# Patient Record
Sex: FEMALE | Race: BLACK OR AFRICAN AMERICAN | NOT HISPANIC OR LATINO | Employment: FULL TIME | URBAN - METROPOLITAN AREA
[De-identification: names, ages, dates, MRNs, and addresses within clinical notes are randomized per-mention and may not be internally consistent; named-entity substitution may affect disease eponyms.]

---

## 2018-04-03 ENCOUNTER — OFFICE VISIT (OUTPATIENT)
Dept: INTERNAL MEDICINE | Facility: CLINIC | Age: 53
End: 2018-04-03
Payer: COMMERCIAL

## 2018-04-03 ENCOUNTER — HOSPITAL ENCOUNTER (OUTPATIENT)
Dept: RADIOLOGY | Facility: HOSPITAL | Age: 53
Discharge: HOME | End: 2018-04-03
Attending: PHYSICIAN ASSISTANT
Payer: COMMERCIAL

## 2018-04-03 VITALS
HEART RATE: 87 BPM | DIASTOLIC BLOOD PRESSURE: 72 MMHG | HEIGHT: 64 IN | RESPIRATION RATE: 16 BRPM | WEIGHT: 198.44 LBS | OXYGEN SATURATION: 98 % | BODY MASS INDEX: 33.88 KG/M2 | SYSTOLIC BLOOD PRESSURE: 104 MMHG | TEMPERATURE: 98 F

## 2018-04-03 DIAGNOSIS — K21.9 GASTROESOPHAGEAL REFLUX DISEASE WITHOUT ESOPHAGITIS: ICD-10-CM

## 2018-04-03 DIAGNOSIS — M25.562 ACUTE PAIN OF LEFT KNEE: ICD-10-CM

## 2018-04-03 DIAGNOSIS — M79.671 FOOT PAIN, RIGHT: ICD-10-CM

## 2018-04-03 DIAGNOSIS — M25.562 ACUTE PAIN OF LEFT KNEE: Primary | ICD-10-CM

## 2018-04-03 DIAGNOSIS — Z00.00 ENCOUNTER FOR ANNUAL PHYSICAL EXAM: ICD-10-CM

## 2018-04-03 PROCEDURE — 73564 X-RAY EXAM KNEE 4 OR MORE: CPT | Mod: 26,LT,, | Performed by: RADIOLOGY

## 2018-04-03 PROCEDURE — 73630 X-RAY EXAM OF FOOT: CPT | Mod: TC,FY,PO,RT

## 2018-04-03 PROCEDURE — 73562 X-RAY EXAM OF KNEE 3: CPT | Mod: 26,59,RT, | Performed by: RADIOLOGY

## 2018-04-03 PROCEDURE — 73562 X-RAY EXAM OF KNEE 3: CPT | Mod: TC,FY,PO,RT

## 2018-04-03 PROCEDURE — 73630 X-RAY EXAM OF FOOT: CPT | Mod: 26,RT,, | Performed by: RADIOLOGY

## 2018-04-03 PROCEDURE — 99214 OFFICE O/P EST MOD 30 MIN: CPT | Mod: S$GLB,,, | Performed by: PHYSICIAN ASSISTANT

## 2018-04-03 PROCEDURE — 99999 PR PBB SHADOW E&M-EST. PATIENT-LVL IV: CPT | Mod: PBBFAC,,, | Performed by: PHYSICIAN ASSISTANT

## 2018-04-03 RX ORDER — PANTOPRAZOLE SODIUM 40 MG/1
40 TABLET, DELAYED RELEASE ORAL DAILY
Qty: 30 TABLET | Refills: 0 | Status: SHIPPED | OUTPATIENT
Start: 2018-04-03 | End: 2018-05-28 | Stop reason: SDUPTHER

## 2018-04-03 NOTE — PROGRESS NOTES
Subjective:       Patient ID: Alma Rosa Carranza is a 53 y.o.B/ female.    Chief Complaint: Knee Pain (L) and Gastroesophageal Reflux    HPI         She comes in by herself today and she is walking assisted by one crutch because of pain in her left knee.  She actually has 4 needs today which will be outlined in a few seconds.  She sprained her knee about 4-7 days ago and does not know how she did it.  It hurts on the medial aspect of her left knee.  She hasn't noticed any swelling.  It also hurts downwards towards the tibial surface and upwards towards the popliteal space.  She has knee pain all the time and she also has foot pain all the time.  She hasn't done anything athletically to aggravate this problem.        She also had podiatric surgery outside the clinic on her right foot at least 2 or 3 times in 2013- 2015 and a plate installed on the top of her right foot.  He broke one time and had to be replaced and now she thinks it has raised off the surface of the bones again.        She did have endoscopy and colonoscopy done 2013.  She was placed on Protonix 40 mg but has run out of it and can't get a refill.  She has instead been taking Zantac 150 mg 2 in the morning and 1 in the evening but it is not controlling her reflux of acid.  She was told in the past that she had a narrowing of her esophagus but they never really came out and said she had a Francisco's esophagus.  She probably needs to go back and see the gastroenterologist for possible endoscopy.  I'll put her back on her Protonix and see if this controls the acid reflux.        She needs to get some yearly blood work done and she has not had a mammogram done in a couple of years.  Looking at her health maintenance needs she also has not had a hepatitis C test.  She has been putting on a lot of weight and she wonders if her thyroid is working properly or not.    Review of Systems    Otherwise negative.  Objective:      Physical Exam   Constitutional:  She is oriented to person, place, and time. She appears well-developed and well-nourished.   She is walking with one crutch under her left arm to help support her painful left knee.  Without the crutch she hobbles quite a bit because of the knee pain.  The right foot does not seem to give her pain or discomfort.   HENT:   Head: Normocephalic and atraumatic.   Right Ear: External ear normal.   Left Ear: External ear normal.   Nose: Nose normal.   Mouth/Throat: Oropharynx is clear and moist. No oropharyngeal exudate.   Eyes: Conjunctivae and EOM are normal. Pupils are equal, round, and reactive to light. No scleral icterus.   Neck: Normal range of motion. Neck supple. No JVD present. No tracheal deviation present. No thyromegaly present.   Cardiovascular: Normal rate, regular rhythm, normal heart sounds and intact distal pulses.  Exam reveals no gallop and no friction rub.    No murmur heard.  Pulmonary/Chest: Effort normal and breath sounds normal. No respiratory distress. She has no wheezes. She has no rales.   Abdominal: Soft. Bowel sounds are normal. She exhibits no distension and no mass. There is no tenderness. There is no rebound and no guarding.   Genitourinary:   Genitourinary Comments: This portion of the examination was not accomplished today.   Musculoskeletal: Normal range of motion. She exhibits no edema or tenderness.   She has slight decreased flexion due to a mild effusion being present.  The knee is not hot or red.  All signs are negative except for pain with test on her medial collateral ligament.  These signs include patella, spring, collateral, Prabhakar's, and drawer signs.   Lymphadenopathy:     She has no cervical adenopathy.   Neurological: She is alert and oriented to person, place, and time. She has normal reflexes. She displays normal reflexes. No cranial nerve deficit. Coordination normal.   Skin: Skin is warm and dry. No rash noted. She is not diaphoretic. No erythema.   Psychiatric: She  has a normal mood and affect. Her behavior is normal. Judgment and thought content normal.   Nursing note and vitals reviewed.        Assessment:       1. Acute pain of left knee    2. Foot pain, right    3. Gastroesophageal reflux disease without esophagitis    4. Encounter for annual physical exam        Plan:     1.  Refilled her Protonix 40 mg.  Stop her Zantac 150 mg.  She also may want to use Gaviscon ES chewable tablets intermittently when she has reflux.  2.  Check CPX labs to include: CBC, Chem-20, FLP's, hepatitis C, TSH.  3.  X-ray right foot.  Refer her to podiatry for evaluation after her x-ray.  4.  X-ray left knee.  Start wearing a neoprene knee brace.  Also take arthritis strength Tylenol for her knee discomfort.  I wouldn't recommend an NSAID right now while she is having reflux problems.  5.  Schedule screening mammogram.  6.  Follow-up after these tests are done.

## 2018-04-06 ENCOUNTER — PATIENT MESSAGE (OUTPATIENT)
Dept: INTERNAL MEDICINE | Facility: CLINIC | Age: 53
End: 2018-04-06

## 2018-04-06 DIAGNOSIS — M79.671 RIGHT FOOT PAIN: ICD-10-CM

## 2018-04-06 DIAGNOSIS — T84.498A OTHER MECHANICAL COMPLICATION OF OTHER INTERNAL ORTHOPEDIC DEVICES, IMPLANTS AND GRAFTS, INITIAL ENCOUNTER: Primary | ICD-10-CM

## 2018-05-03 ENCOUNTER — HOSPITAL ENCOUNTER (OUTPATIENT)
Dept: RADIOLOGY | Facility: HOSPITAL | Age: 53
Discharge: HOME | End: 2018-05-03
Attending: PHYSICIAN ASSISTANT
Payer: COMMERCIAL

## 2018-05-03 VITALS — BODY MASS INDEX: 33.8 KG/M2 | WEIGHT: 198 LBS | HEIGHT: 64 IN

## 2018-05-03 DIAGNOSIS — Z00.00 ENCOUNTER FOR ANNUAL PHYSICAL EXAM: ICD-10-CM

## 2018-05-03 PROCEDURE — 77067 SCR MAMMO BI INCL CAD: CPT | Mod: TC,PO

## 2018-05-03 PROCEDURE — 77063 BREAST TOMOSYNTHESIS BI: CPT | Mod: 26,,, | Performed by: RADIOLOGY

## 2018-05-03 PROCEDURE — 77067 SCR MAMMO BI INCL CAD: CPT | Mod: 26,,, | Performed by: RADIOLOGY

## 2018-05-08 ENCOUNTER — TELEPHONE (OUTPATIENT)
Dept: INTERNAL MEDICINE | Facility: CLINIC | Age: 53
End: 2018-05-08

## 2018-05-08 DIAGNOSIS — R92.1 CALCIFICATION OF RIGHT BREAST ON MAMMOGRAPHY: Primary | ICD-10-CM

## 2018-05-08 NOTE — TELEPHONE ENCOUNTER
Reviewed 05/03/18 mammogram results w/ pt (see result note). Advised pt will call her back once Rt sided mammo scheduled, pt voiced understanding.     Order pended for provider signature.

## 2018-05-09 NOTE — TELEPHONE ENCOUNTER
S/w Patricia in Mammo sched appt for diag mammo on 05/17/18 @ 3:00pm [facility]. Pt informed of appt time and instructions, pt voiced understanding.

## 2018-05-17 ENCOUNTER — HOSPITAL ENCOUNTER (OUTPATIENT)
Dept: RADIOLOGY | Facility: HOSPITAL | Age: 53
Discharge: HOME | End: 2018-05-17
Attending: PHYSICIAN ASSISTANT
Payer: COMMERCIAL

## 2018-05-17 DIAGNOSIS — R92.1 CALCIFICATION OF RIGHT BREAST ON MAMMOGRAPHY: ICD-10-CM

## 2018-05-17 PROCEDURE — 77065 DX MAMMO INCL CAD UNI: CPT | Mod: TC,PO,RT

## 2018-05-17 PROCEDURE — 77065 DX MAMMO INCL CAD UNI: CPT | Mod: 26,RT,, | Performed by: RADIOLOGY

## 2018-05-23 ENCOUNTER — TELEPHONE (OUTPATIENT)
Dept: RADIOLOGY | Facility: HOSPITAL | Age: 53
End: 2018-05-23

## 2018-05-24 ENCOUNTER — TELEPHONE (OUTPATIENT)
Dept: INTERNAL MEDICINE | Facility: CLINIC | Age: 53
End: 2018-05-24

## 2018-05-24 NOTE — TELEPHONE ENCOUNTER
S/w pt confirming she's sched to see breast specialist Dr. Dominguez at [facility], advised pt would send results to this provider, pt voiced understanding.   Pt stated she was told she would have to go to [facility] location for records and radiology films to take to [facility] and there would be a charge for her records, advised pt would send inquiry to that dept and let her know their response, pt voiced understanding.

## 2018-05-24 NOTE — TELEPHONE ENCOUNTER
----- Message from Yusra Ceballos NP sent at 5/23/2018  4:56 PM CDT -----  Contact: PCP  Hi,  I made the pt an appt to see me Yusra, on Monday- 5/28/18 at 8:30 am at the [facility] location. Please tell her to check in on the second floor of that clinic and to be there by 8:15 am.     This is a consultation appointment to coordinate her care and get her scheduled for the biopsy so no procedure will be done that day. An examination, review and explaination of test results and biopsy procedure to be scheduled will be discussed.     Please call pt and inform of this appt.    Thank you!    Yusra  ----- Message -----  From: Susan Germain MA  Sent: 5/23/2018   4:42 PM  To: Yusra Ceballos NP    Per ARIN Carrera, could you please take a look at pt's 05/23/18 mammogram result - does pt need to see you prior to the suggested biopsy, and if so we'll place a referral to get pt scheduled? Or if not how do we go about ordering it?

## 2018-05-24 NOTE — TELEPHONE ENCOUNTER
04/2018 and 05/2018 mammo results faxed to Dr. Tiana Dominguez's office w/ [facility] F#[phone] P#[phone], fax transmission confirmed.

## 2018-05-28 DIAGNOSIS — K21.9 GASTROESOPHAGEAL REFLUX DISEASE WITHOUT ESOPHAGITIS: ICD-10-CM

## 2018-05-28 RX ORDER — PANTOPRAZOLE SODIUM 40 MG/1
TABLET, DELAYED RELEASE ORAL
Qty: 30 TABLET | Refills: 0 | Status: SHIPPED | OUTPATIENT
Start: 2018-05-28 | End: 2018-07-05 | Stop reason: SDUPTHER

## 2018-07-05 DIAGNOSIS — K21.9 GASTROESOPHAGEAL REFLUX DISEASE WITHOUT ESOPHAGITIS: ICD-10-CM

## 2018-07-05 RX ORDER — PANTOPRAZOLE SODIUM 40 MG/1
TABLET, DELAYED RELEASE ORAL
Qty: 30 TABLET | Refills: 0 | Status: SHIPPED | OUTPATIENT
Start: 2018-07-05 | End: 2018-08-04 | Stop reason: SDUPTHER

## 2018-07-12 ENCOUNTER — TELEPHONE (OUTPATIENT)
Dept: INTERNAL MEDICINE | Facility: CLINIC | Age: 53
End: 2018-07-12

## 2018-07-12 NOTE — TELEPHONE ENCOUNTER
----- Message from Ramírez Freeman sent at 7/12/2018  2:24 PM CDT -----  Contact: pt   Pt returning call, pls call back.         ..[phone] (home)

## 2018-07-12 NOTE — TELEPHONE ENCOUNTER
S/w pt RE Pre-op appt/testing needed for breast surgery sched 07/23/18 thru [facility], advised pt would have to see other provider since ARIN Keyred since he's out office until 07/23/18. Pt voiced understanding, stating she will call back to sched appt if unable to sched w/ PCP Dr. Carlos Eduardo Stevens and if 04/2018 lab results need to be sent to her office.

## 2018-07-12 NOTE — TELEPHONE ENCOUNTER
----- Message from Lydia Brower sent at 7/12/2018 10:26 AM CDT -----  Contact: Alma [address] at [phone]    Regarding pre op clearance being sent over

## 2018-07-12 NOTE — TELEPHONE ENCOUNTER
S/w pt and sched for pre-op appt w/ NP Kevyn on Monday 07/16/18 @ 08:40AM, pt confirmed appt date/time and provider. Pt denied given form from surgeons' office, clearance notes need to be send to Dr. Angelica crawford/ [facility] and Dr. Taylor Theunissen, Plastic Surgeon.

## 2018-07-12 NOTE — TELEPHONE ENCOUNTER
S/w Pati, Dr. Dominguez's assistant, she confirmed pt does need the following pre-op labs: CBC, CMP, PT/PTT, CXR, and EKG, no form to be completed, just send results w/ clearanace note to their F#[phone].

## 2018-07-16 ENCOUNTER — CLINICAL SUPPORT (OUTPATIENT)
Dept: CARDIOLOGY | Facility: CLINIC | Age: 53
End: 2018-07-16
Payer: COMMERCIAL

## 2018-07-16 ENCOUNTER — HOSPITAL ENCOUNTER (OUTPATIENT)
Dept: RADIOLOGY | Facility: HOSPITAL | Age: 53
Discharge: HOME | End: 2018-07-16
Attending: NURSE PRACTITIONER
Payer: COMMERCIAL

## 2018-07-16 ENCOUNTER — OFFICE VISIT (OUTPATIENT)
Dept: INTERNAL MEDICINE | Facility: CLINIC | Age: 53
End: 2018-07-16
Payer: COMMERCIAL

## 2018-07-16 VITALS
BODY MASS INDEX: 33.73 KG/M2 | DIASTOLIC BLOOD PRESSURE: 74 MMHG | TEMPERATURE: 98 F | SYSTOLIC BLOOD PRESSURE: 116 MMHG | HEIGHT: 64 IN | OXYGEN SATURATION: 100 % | WEIGHT: 197.56 LBS | HEART RATE: 84 BPM

## 2018-07-16 DIAGNOSIS — Z01.818 PRE-OP EXAM: ICD-10-CM

## 2018-07-16 DIAGNOSIS — Z01.818 PRE-OP EXAM: Primary | ICD-10-CM

## 2018-07-16 DIAGNOSIS — K21.9 GASTROESOPHAGEAL REFLUX DISEASE WITHOUT ESOPHAGITIS: ICD-10-CM

## 2018-07-16 DIAGNOSIS — E66.9 OBESITY, CLASS I, BMI 30-34.9: ICD-10-CM

## 2018-07-16 PROCEDURE — 71046 X-RAY EXAM CHEST 2 VIEWS: CPT | Mod: TC,FY,PO

## 2018-07-16 PROCEDURE — 3008F BODY MASS INDEX DOCD: CPT | Mod: CPTII,S$GLB,, | Performed by: NURSE PRACTITIONER

## 2018-07-16 PROCEDURE — 99999 PR PBB SHADOW E&M-EST. PATIENT-LVL III: CPT | Mod: PBBFAC,,, | Performed by: NURSE PRACTITIONER

## 2018-07-16 PROCEDURE — 93000 ELECTROCARDIOGRAM COMPLETE: CPT | Mod: S$GLB,,, | Performed by: NUCLEAR MEDICINE

## 2018-07-16 PROCEDURE — 99214 OFFICE O/P EST MOD 30 MIN: CPT | Mod: S$GLB,,, | Performed by: NURSE PRACTITIONER

## 2018-07-16 PROCEDURE — 71046 X-RAY EXAM CHEST 2 VIEWS: CPT | Mod: 26,,, | Performed by: RADIOLOGY

## 2018-08-04 DIAGNOSIS — K21.9 GASTROESOPHAGEAL REFLUX DISEASE WITHOUT ESOPHAGITIS: ICD-10-CM

## 2018-08-06 RX ORDER — PANTOPRAZOLE SODIUM 40 MG/1
TABLET, DELAYED RELEASE ORAL
Qty: 30 TABLET | Refills: 0 | Status: SHIPPED | OUTPATIENT
Start: 2018-08-06 | End: 2018-08-30 | Stop reason: SDUPTHER

## 2018-08-30 DIAGNOSIS — K21.9 GASTROESOPHAGEAL REFLUX DISEASE WITHOUT ESOPHAGITIS: ICD-10-CM

## 2018-08-31 DIAGNOSIS — K21.9 GASTROESOPHAGEAL REFLUX DISEASE WITHOUT ESOPHAGITIS: ICD-10-CM

## 2018-08-31 RX ORDER — PANTOPRAZOLE SODIUM 40 MG/1
TABLET, DELAYED RELEASE ORAL
Qty: 30 TABLET | Refills: 0 | OUTPATIENT
Start: 2018-08-31

## 2018-08-31 RX ORDER — PANTOPRAZOLE SODIUM 40 MG/1
TABLET, DELAYED RELEASE ORAL
Qty: 30 TABLET | Refills: 0 | Status: SHIPPED | OUTPATIENT
Start: 2018-08-31 | End: 2018-09-05 | Stop reason: SDUPTHER

## 2018-09-05 ENCOUNTER — TELEPHONE (OUTPATIENT)
Dept: PODIATRY | Facility: CLINIC | Age: 53
End: 2018-09-05

## 2018-09-05 ENCOUNTER — LAB VISIT (OUTPATIENT)
Dept: LAB | Facility: HOSPITAL | Age: 53
End: 2018-09-05
Attending: PHYSICIAN ASSISTANT
Payer: COMMERCIAL

## 2018-09-05 ENCOUNTER — OFFICE VISIT (OUTPATIENT)
Dept: INTERNAL MEDICINE | Facility: CLINIC | Age: 53
End: 2018-09-05
Payer: COMMERCIAL

## 2018-09-05 VITALS
OXYGEN SATURATION: 97 % | RESPIRATION RATE: 16 BRPM | DIASTOLIC BLOOD PRESSURE: 72 MMHG | TEMPERATURE: 98 F | HEART RATE: 88 BPM | BODY MASS INDEX: 33.69 KG/M2 | WEIGHT: 197.31 LBS | HEIGHT: 64 IN | SYSTOLIC BLOOD PRESSURE: 122 MMHG

## 2018-09-05 DIAGNOSIS — Z85.3 HISTORY OF CANCER OF RIGHT BREAST: ICD-10-CM

## 2018-09-05 DIAGNOSIS — K21.9 GASTROESOPHAGEAL REFLUX DISEASE WITHOUT ESOPHAGITIS: ICD-10-CM

## 2018-09-05 DIAGNOSIS — Z01.818 PRE-OPERATIVE CLEARANCE: Primary | ICD-10-CM

## 2018-09-05 DIAGNOSIS — M47.812 DJD (DEGENERATIVE JOINT DISEASE), CERVICAL: Chronic | ICD-10-CM

## 2018-09-05 DIAGNOSIS — Z01.818 PRE-OPERATIVE CLEARANCE: ICD-10-CM

## 2018-09-05 DIAGNOSIS — M50.30 DEGENERATIVE CERVICAL DISC: ICD-10-CM

## 2018-09-05 DIAGNOSIS — E66.9 OBESITY, CLASS I, BMI 30-34.9: ICD-10-CM

## 2018-09-05 LAB
ALBUMIN SERPL BCP-MCNC: 4.3 G/DL
ALP SERPL-CCNC: 91 U/L
ALT SERPL W/O P-5'-P-CCNC: 12 U/L
ANION GAP SERPL CALC-SCNC: 8 MMOL/L
AST SERPL-CCNC: 16 U/L
BASOPHILS # BLD AUTO: 0.01 K/UL
BASOPHILS NFR BLD: 0.2 %
BILIRUB SERPL-MCNC: 0.8 MG/DL
BUN SERPL-MCNC: 8 MG/DL
CALCIUM SERPL-MCNC: 10 MG/DL
CHLORIDE SERPL-SCNC: 107 MMOL/L
CO2 SERPL-SCNC: 27 MMOL/L
CREAT SERPL-MCNC: 1 MG/DL
DIFFERENTIAL METHOD: ABNORMAL
EOSINOPHIL # BLD AUTO: 0.1 K/UL
EOSINOPHIL NFR BLD: 2.6 %
ERYTHROCYTE [DISTWIDTH] IN BLOOD BY AUTOMATED COUNT: 14.6 %
EST. GFR  (AFRICAN AMERICAN): >60 ML/MIN/1.73 M^2
EST. GFR  (NON AFRICAN AMERICAN): >60 ML/MIN/1.73 M^2
GLUCOSE SERPL-MCNC: 103 MG/DL
HCT VFR BLD AUTO: 38 %
HGB BLD-MCNC: 12.4 G/DL
LYMPHOCYTES # BLD AUTO: 1.7 K/UL
LYMPHOCYTES NFR BLD: 33.1 %
MCH RBC QN AUTO: 31.5 PG
MCHC RBC AUTO-ENTMCNC: 32.6 G/DL
MCV RBC AUTO: 96 FL
MONOCYTES # BLD AUTO: 0.6 K/UL
MONOCYTES NFR BLD: 11.1 %
NEUTROPHILS # BLD AUTO: 2.7 K/UL
NEUTROPHILS NFR BLD: 53 %
PLATELET # BLD AUTO: 405 K/UL
PMV BLD AUTO: 9.6 FL
POTASSIUM SERPL-SCNC: 4.4 MMOL/L
PROT SERPL-MCNC: 7.9 G/DL
RBC # BLD AUTO: 3.94 M/UL
SODIUM SERPL-SCNC: 142 MMOL/L
WBC # BLD AUTO: 5.04 K/UL

## 2018-09-05 PROCEDURE — 85025 COMPLETE CBC W/AUTO DIFF WBC: CPT | Mod: PO

## 2018-09-05 PROCEDURE — 36415 COLL VENOUS BLD VENIPUNCTURE: CPT | Mod: PO

## 2018-09-05 PROCEDURE — 99999 PR PBB SHADOW E&M-EST. PATIENT-LVL V: CPT | Mod: PBBFAC,,, | Performed by: PHYSICIAN ASSISTANT

## 2018-09-05 PROCEDURE — 80053 COMPREHEN METABOLIC PANEL: CPT | Mod: PO

## 2018-09-05 PROCEDURE — 3008F BODY MASS INDEX DOCD: CPT | Mod: CPTII,S$GLB,, | Performed by: PHYSICIAN ASSISTANT

## 2018-09-05 PROCEDURE — 99215 OFFICE O/P EST HI 40 MIN: CPT | Mod: S$GLB,,, | Performed by: PHYSICIAN ASSISTANT

## 2018-09-05 RX ORDER — ANASTROZOLE 1 MG/1
1 TABLET ORAL DAILY
COMMUNITY
Start: 2018-08-30 | End: 2019-08-30

## 2018-09-05 RX ORDER — PANTOPRAZOLE SODIUM 40 MG/1
40 TABLET, DELAYED RELEASE ORAL DAILY
COMMUNITY
Start: 2018-08-06 | End: 2018-09-05 | Stop reason: SDUPTHER

## 2018-09-05 RX ORDER — MELATONIN 5 MG
2 CAPSULE ORAL NIGHTLY PRN
COMMUNITY

## 2018-09-05 RX ORDER — ACETAMINOPHEN 500 MG
1000 TABLET ORAL
COMMUNITY

## 2018-09-05 RX ORDER — PANTOPRAZOLE SODIUM 40 MG/1
40 TABLET, DELAYED RELEASE ORAL DAILY
Qty: 30 TABLET | Refills: 4 | Status: SHIPPED | OUTPATIENT
Start: 2018-09-05

## 2018-09-05 NOTE — PROGRESS NOTES
Subjective:       Patient ID: Alma Rosa Carranza is a 53 y.o.B/ female.    Chief Complaint: Pre-op Exam (18, R ankle w/ hardware, Dr. Beaver w/ [facility])    HPI         She comes in by herself today and has the above problem.  She is going to have a 3rd or 4th operation on her right foot by podiatrist Dr. Elizabet Beaver on  and she needs a pre-op clearance.  She says a couple of the screws holding some plates have loosened and are floating in the top of her foot and there has not been adequate calcium buildup to heal the fracture.  This has been going on now for 4 years.  She has no idea how the fracture 1st occurred because she had no trauma or MVAs.  She has had vitamin-D levels that were low but she is not taking vitamin-D at this time.  She also has had a DEXA scan run at [facility] 2 years ago but does not know the results of that test.  She has never had 1 run here.  She would like to get a 2nd opinion from a podiatrist.          She currently does not have any acute illness symptoms including her:  RESPIRATORY, PULMONARY, GI, , GYN, and INTEGUMENT system review.    PAST MEDICAL HISTORY    CHILDHOOD:  Negative.  MEDICAL:  Degenerative joint disease multiple areas. DDD of cervical spine .   Right breast cancer 2018. GERD.  SURGICAL:  Patient is  A/B 0. Left foot surgery . Tubal ligation age 29. Right foot operated on , 2015, 2016.  Right Breast removal 2018. In   FAMILY:  Father  age 72.  Had lung cancer.                 Mother is alive in fair health  Age 76. Has had bladder cancer and followed by breast cancer.                   She had 3 sisters originally.  One sister  age 44 due to breast cancer.  The other 2 sisters are living and well.                 She has 1 brother living and well.                 She has 3 children living and well age 38, 30, 24.                     She has 6 grand children all living and well.                   She has  0 great grandchildren.  SOCIAL HABITS:  She is  times 18 years.  She lives at home with her 24-year-old child.                 She is a paraprofessional in elementary school..                 She never used tobacco products.                 She socially uses ETOH products                   She never uses caffeine products.                 EDUCATION:12 + 2   With an  Associate degree.                 She has had no foreign travel.                        She has never required any tropical diseases.    Review of Systems   Constitutional: Negative.  Negative for appetite change, chills, diaphoresis, fatigue, fever and unexpected weight change.   HENT: Negative.  Negative for congestion, ear pain, hearing loss, mouth sores, postnasal drip, rhinorrhea, sinus pressure, sneezing, sore throat, tinnitus, trouble swallowing and voice change.    Eyes: Negative.  Negative for photophobia, discharge, redness and visual disturbance.   Respiratory: Negative for cough, chest tightness, shortness of breath and wheezing.    Cardiovascular: Negative.  Negative for chest pain, palpitations and leg swelling.   Gastrointestinal: Negative.  Negative for abdominal pain, blood in stool, constipation, diarrhea, nausea and vomiting.   Endocrine: Negative.  Negative for cold intolerance, heat intolerance, polydipsia and polyuria.   Genitourinary: Negative.  Negative for difficulty urinating, dysuria, enuresis, frequency, hematuria, pelvic pain, urgency, vaginal bleeding, vaginal discharge and vaginal pain.   Musculoskeletal: Negative.  Negative for arthralgias, back pain, gait problem, myalgias and neck stiffness.   Skin: Negative for color change and rash.   Allergic/Immunologic: Negative.  Negative for environmental allergies, food allergies and immunocompromised state.   Neurological: Negative.  Negative for dizziness, tremors, syncope, weakness, light-headedness, numbness and headaches.   Hematological: Negative.  Negative for  adenopathy. Does not bruise/bleed easily.   Psychiatric/Behavioral: Negative.  Negative for confusion, dysphoric mood, hallucinations, self-injury and sleep disturbance. The patient is not nervous/anxious.        Otherwise negative.  Objective:      Physical Exam   Constitutional: She is oriented to person, place, and time. She appears well-developed and well-nourished.   She is ambulatory with no noticeable limp.  Her gait is normal.  Her posture is normal.  She is not utilizing any accessory devices such as a cane, crutch, or walker.   HENT:   Head: Normocephalic and atraumatic.   Right Ear: External ear normal.   Left Ear: External ear normal.   Nose: Nose normal.   Mouth/Throat: Oropharynx is clear and moist.   Her hearing is normal and she does not use hearing aids.  Her teeth and gums are in good repair.   Eyes: EOM are normal. Pupils are equal, round, and reactive to light. No scleral icterus.   She is not wearing any glasses or contacts.   Neck: Normal range of motion. Neck supple. No thyromegaly present.   Cardiovascular: Normal rate, regular rhythm, normal heart sounds and intact distal pulses. Exam reveals no gallop.   No murmur heard.  Pulmonary/Chest: Effort normal and breath sounds normal. No respiratory distress. She has no wheezes. She has no rales.   Abdominal: Soft. Bowel sounds are normal. She exhibits no distension and no mass.   Genitourinary:   Genitourinary Comments: This portion of the examination was not accomplished today.   Musculoskeletal: Normal range of motion. She exhibits no edema.   Lymphadenopathy:     She has no cervical adenopathy.   Neurological: She is alert and oriented to person, place, and time. She has normal reflexes. No cranial nerve deficit.   Skin: Skin is warm and dry. No rash noted. No erythema.   Psychiatric: She has a normal mood and affect. Her behavior is normal. Judgment and thought content normal.   Nursing note and vitals reviewed.   Her CBC and Chem 20 were  normal.    Assessment:       1. Pre-operative clearance    2. Degenerative cervical disc    3. DJD (degenerative joint disease), cervical    4. History of cancer of right breast    5. Obesity, Class I, BMI 30-34.9    6. Gastroesophageal reflux disease without esophagitis        Plan:       1. Pre- operative labs have been ordered to include CBC, Chem 20, vitamin-D level, and DEXA scan.  2. She asked for and a 2nd opinion Podiatry referral was done here in the clinic with 1 of our podiatrists.  3. She would be low risk for complication from local or general anesthetic and would be cleared for surgery as long as her lab values come back normal.  4. She was also instructed to start on vitamin D3 31070 units once a week and also 1200 mg calcium tablets b.i.d..  These can be done in the form of Tums antacids.

## 2018-09-05 NOTE — TELEPHONE ENCOUNTER
Ms. Alma Rosa Dixon was given a call and she was informed that her appointment with Dr. Jimenez has been scheduled for tomorrow 9/6/2018 for 9am on the 2nd floor of the [facility]. When asked what foot she was coming in for she stated the right foot to have a 2nd opinion in regards to having surgery since 2014 by a foot and ankle specialist by the name Dr. Elizabet Beaver. She also informed me that she has a history of flat feet. Patient verbalized understanding of her appointment date and time and the call ended well.        FYI: One of the registration desk ladies,came back and stated that Ms. Carranza wanted to be worked in on Dr. Jimenez schedule to have a 2nd opinion before her 9/18/2018 sx. I asked what type of foot sx she was having and what podiatrist was performing the sx, but the registration desk lady did not have an answer for that. She last seen Dr. Nava in 2013 or 2014 and has an upcoming appointment with her, but she stated that she wanted to see Dr. Jimenez. After consulting with Dr. Jimenez I was given the okay to schedule the patient on 9/6/2018 for 9am.

## 2018-09-06 ENCOUNTER — OFFICE VISIT (OUTPATIENT)
Dept: PODIATRY | Facility: CLINIC | Age: 53
End: 2018-09-06
Payer: COMMERCIAL

## 2018-09-06 VITALS
SYSTOLIC BLOOD PRESSURE: 123 MMHG | BODY MASS INDEX: 33.69 KG/M2 | HEART RATE: 106 BPM | HEIGHT: 64 IN | WEIGHT: 197.31 LBS | DIASTOLIC BLOOD PRESSURE: 72 MMHG

## 2018-09-06 DIAGNOSIS — Z96.9 RETAINED ORTHOPEDIC HARDWARE: ICD-10-CM

## 2018-09-06 DIAGNOSIS — M19.079 ARTHRITIS OF MIDFOOT: Primary | ICD-10-CM

## 2018-09-06 DIAGNOSIS — M21.41 ACQUIRED PES PLANOVALGUS OF RIGHT FOOT: ICD-10-CM

## 2018-09-06 DIAGNOSIS — E66.9 OBESITY, CLASS I, BMI 30-34.9: ICD-10-CM

## 2018-09-06 DIAGNOSIS — M62.461 GASTROCNEMIUS EQUINUS OF RIGHT LOWER EXTREMITY: ICD-10-CM

## 2018-09-06 DIAGNOSIS — M62.462 GASTROCNEMIUS EQUINUS OF LEFT LOWER EXTREMITY: ICD-10-CM

## 2018-09-06 PROCEDURE — 99999 PR PBB SHADOW E&M-EST. PATIENT-LVL III: CPT | Mod: PBBFAC,,, | Performed by: PODIATRIST

## 2018-09-06 PROCEDURE — 99243 OFF/OP CNSLTJ NEW/EST LOW 30: CPT | Mod: S$GLB,,, | Performed by: PODIATRIST

## 2018-09-06 NOTE — PATIENT INSTRUCTIONS
Dr. Eduardo Fischer, DPM  [facility]  [address]  [place], [place] [id number] [phone]

## 2018-09-06 NOTE — LETTER
September 9, 2018      Christopher Carrera PA-C  [address] [place] [address]           [facility] - Podiat[facility]  [address] [place] [address]  Phone: [phone]  Fax: [phone]          Patient: Alma Rosa Carranza   MR Number: [id number]   YOB: 1965   Date of Visit: 9/6/2018       Dear Christopher Carrera:    Thank you for referring Alma Rosa Carranza to me for evaluation. Attached you will find relevant portions of my assessment and plan of care.    If you have questions, please do not hesitate to call me. I look forward to following Alma Rosa Carranza along with you.    Sincerely,    Emely Jimenez, QUINTIN    Enclosure  CC:  No Recipients    If you would like to receive this communication electronically, please contact [email or web address] or [phone] to request more information on [employer] access.    For providers and/or their staff who would like to refer a patient to [facility], please contact us through our one-stop-shop provider referral line, [facility] , at [phone].    If you feel you have received this communication in error or would no longer like to receive these types of communications, please e-mail [email or web address]

## 2018-09-06 NOTE — PROGRESS NOTES
[facility] -   PODIATRIC MEDICINE AND SURGERY  PROGRESS NOTE  9/9/2018    PODIATRY NOTE  PCP:  Primary Doctor No    CHIEF COMPLAINT   Chief Complaint   Patient presents with    Foot Problem     Pt states that she had sx 4 years ago on her right foot, possible fusion, and the screws  and plate broke several times, and she was told that it never fused. Dr. Beaver did her sx and nothing was done about it. She states that she is in pain 5/10.       HPI:    Alma Rosa Carranza is a 53 y.o. female who has a past medical history of Arthralgia.   Alma Rosa presents to clinic today complaining of right foot surgery. Pt describes what sounds as a medial column fusion, and soft tissue tendon transfer for flatfoot surgery. States she had surgery in 2014 and then stated her hardware broke and had repeat intervention one year later. Surgeon- Dr. Beaver. She is here to for second opinion; pain present for medial aspect flatfoot .  It she states that she started to have pain along the surgical site and midfoot for the past 4 months.  She did see Dr. renee physician assistant last week recommended repeat surgical intervention and also noted that she had broken hardware.  Patient is concerned as this is the 3rd surgery that she will have to undergo and continues to have surgical complications.  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 She is currently off work as she has been on disability since undergoing mastectomy for breast cancer.  She was referred to my clinic by my colleague Christopher Carrera for consultation and evaluation of her longstanding right foot pain.      Patient denies other pedal complaints at this time.     PMH  Past Medical History:   Diagnosis Date    Arthralgia        PROBLEM LIST  Patient Active Problem List    Diagnosis Date Noted    History of cancer of right breast 09/05/2018    Obesity, Class I, BMI 30-34.9 06/24/2016    Schatzki's ring 05/18/2015    Degenerative  cervical disc 11/25/2014    DJD (degenerative joint disease), cervical 11/25/2014    Plantar fasciitis of right foot 06/06/2014    Pes planus (flat feet) 06/06/2014    Lipoma 06/06/2014    GERD (gastroesophageal reflux disease) 10/02/2013    Constipation 10/02/2013    Arthralgia 10/02/2013    Insomnia 10/02/2013       MEDS  Current Outpatient Medications on File Prior to Visit   Medication Sig Dispense Refill    acetaminophen (TYLENOL) 500 MG tablet Take 1,000 mg by mouth as needed.      anastrozole (ARIMIDEX) 1 mg Tab Take 1 mg by mouth once daily.      diphenhydramine-acetaminophen (TYLENOL PM)  mg Tab Take 1 tablet by mouth nightly as needed.      Lactobacillus acidophilus (PROBIOTIC ACIDOPHILUS ORAL) Take 1 capsule by mouth once daily.      melatonin 5 mg Cap Take 2 capsules by mouth nightly as needed.      pantoprazole (PROTONIX) 40 MG tablet Take 1 tablet (40 mg total) by mouth once daily. 30 tablet 4    pedi multivit no.19-folic acid (CHILDREN'S MULTI-VIT GUMMIES) 200 mcg Chew Take by mouth.      SCAR CARE BASE ENHANCED Gel Apply 0.5 g topically 2 (two) times daily.  3     No current facility-administered medications on file prior to visit.           Medication List           Accurate as of 9/6/18 11:59 PM. If you have any questions, ask your nurse or doctor.               CONTINUE taking these medications    acetaminophen 500 MG tablet  Commonly known as:  TYLENOL     anastrozole 1 mg Tab  Commonly known as:  ARIMIDEX     CHILDREN'S MULTI-VIT GUMMIES 200 mcg Chew  Generic drug:  pedi multivit no.19-folic acid     diphenhydramine-acetaminophen  mg Tab  Commonly known as:  TYLENOL PM     melatonin 5 mg Cap     pantoprazole 40 MG tablet  Commonly known as:  PROTONIX  Take 1 tablet (40 mg total) by mouth once daily.     PROBIOTIC ACIDOPHILUS ORAL     SCAR CARE BASE ENHANCED Gel  Generic drug:  gel base no.184 (bulk)            PS     Past Surgical History:   Procedure Laterality Date     COLONOSCOPY N/A 11/25/2013    [facility]formed [facility]    EGD (ESOPHAGOGASTRODUODENOSCOPY) N/A 11/25/2013    Per[facility] by [facility] at [facility]    FOOT SURGERY Bilateral     with Hardware Placement    MODIFIED RADICAL MASTECTOMY W/ AXILLARY LYMPH NODE DISSECTION Right 07/23/2018    TUBAL LIGATION          ALL  Review of patient's allergies indicates:   Allergen Reactions    Clindamycin Nausea And Vomiting    Hydrocodone Nausea And Vomiting       SOC     Social History     Tobacco Use    Smoking status: Never Smoker    Smokeless tobacco: Never Used   Substance Use Topics    Alcohol use: Yes     Frequency: 2-4 times a month     Comment: Occasionally    Drug use: No         FAMILY HX    Family History   Problem Relation Age of Onset    Cancer Mother         Bladder  cancer    Breast cancer Mother     Hypertension Father     Cancer Sister         Breast with mets    Breast cancer Sister     Heart failure Paternal Grandfather     Cancer Maternal Grandmother         Colon cancer    Colon cancer Paternal Grandmother     Glaucoma Maternal Aunt     Cataracts Maternal Aunt     Macular degeneration Maternal Aunt     Heart disease Neg Hx         CAD    Stroke Neg Hx     Diabetes Neg Hx             REVIEW OF SYSTEMS  General: This patient is well-developed, well-nourished and appears stated age, well-oriented to person, place and time, and cooperative and pleasant on today's visit  Constitutional: Negative for chills and fever.   Respiratory: Negative for shortness of breath.    Cardiovascular: Negative for chest pain, palpitations, orthopnea  Gastrointestinal: Negative for diarrhea, nausea and vomiting.   Musculoskeletal: Positive for above noted in HPI  Skin: Negative for rash, skin, or nail changes  Neurological: Negative for tingling and sensory changes  Peripheral Vascular: no claudication or cyanosis  Psychiatric/Behavioral: Negative for altered mental status     PHYSICAL  "EXAM:      Vitals:    09/06/18 0914   BP: 123/72   Pulse: 106   Weight: 89.5 kg (197 lb 5 oz)   Height: 5' 4" (1.626 m)         LOWER EXTREMITY PHYSICAL EXAM  VASCULAR  Dorsalis pedis and posterior tibial pulses palpable 2/4 bilaterally. Capillary refill time immediate to the toes. Feet are warm to the touch. Skin temperature warm to warm from proximally to distally There are no varicosities, telangiectasias noted to bilateral foot and ankle regions. There are no ecchymoses noted to bilateral foot and ankle regions. There is gross lower extremity edema.    DERMATOLOGIC  Skin moist with healthy texture and turgor.There are no open ulcerations, lacerations, or fissures to bilateral foot and ankle regions. There are no signs of infection as there is no erythema, no proximal-extending lymphangiitis, no fluctuance. There is  crepitus noted on palpation to midfoot RIGHT.  There is no interdigital maceration.   There are no hyperkeratotic lesions noted to feet. Nails are well-trimmed.    NEUROLOGIC  Epicritic sensation is intact as the patient is able to sense light touch to bilateral foot and ankle regions. Achilles and patellar deep tendon reflexes intact. Babinski reflex absent    ORTHOPEDIC/BIOMECHANICAL  There is tenderness with dorsal and medial palpation of right foot along surgical sites.  There is prominent hardware palpated on mid foot right. Muscle strength AT/EHL/EDL/PT: 5/5; Achilles/Gastroc/Soleus: 5/5; PB/PL: 5/5 Muscle tone is normal. Ankle joint ROM non painful with decreased DF noted with knee extended and flexed; STJ ROM  Inv/ev non painful, limited ; MTPJ b/l  DF/PF, non crepitus ; Foot type: maximally pronated.    IMAGING   Reviewed by me and I agree with radiologist findings, 3 views of foot/ankle, reveal:    Results for orders placed during the hospital encounter of 04/03/18   X-Ray Foot Complete Right    Narrative EXAMINATION:  XR FOOT COMPLETE 3 VIEW RIGHT    CLINICAL HISTORY:  pt had surgery with " plate implant 2015 and it has loosened up or broken.;. Pain in right foot    TECHNIQUE:  AP, lateral, and oblique views of the right foot were performed.    COMPARISON:  None    FINDINGS:  Postsurgical changes with plate and screw fixation are noted about the 1st tarsometatarsal joint.  There is evidence of hardware failure with fracture of the three more proximal screws.  (There is a screw fragment seen within the dorsal soft tissues  of the midfoot.  Two additional screws have also fractured migrated dorsally).  There are degenerative changes at the junction of the hindfoot and midfoot as well as within the midfoot with some soft tissue prominence about the forefoot region.  Pes planus deformity is noted.  No acute fracture or dislocation is seen.      Impression As above      Electronically signed by: Edgardo Hemphill MD  Date:    04/03/2018  Time:    10:50           ASSESSMENT     Encounter Diagnoses   Name Primary?    Arthritis of midfoot Yes    Retained orthopedic hardware     Acquired pes planovalgus of right foot     Obesity, Class I, BMI 30-34.9     Gastrocnemius equinus of right lower extremity     Gastrocnemius equinus of left lower extremity          PLAN  Patient was educated about clinical and imaging findings, and verbalizes understanding of above.     Diagnoses and all orders for this visit:  Arthritis of midfoot    Retained orthopedic hardware    Acquired pes planovalgus of right foot    Obesity, Class I, BMI 30-34.9    Gastrocnemius equinus of right lower extremity    Gastrocnemius equinus of left lower extremity      I discussed in depth with patient radiographic findings and clinical findings.  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  I did discuss with patient surgical intervention options and recovery.  She does have follow-up scheduled with Dr. Beaver who I agree  she should see and discuss surgical planning and prognosis.  Patient would also benefit from getting bone density testing and starting vitamin-D supplementation and she has previous history of nonunion.  She will likely require biological substitutes to ensure adequate healing as this is her 3rd surgical intervention.  All questions were answered to patient's satisfaction.  Patient was informed to report to my clinic if there are any further problems questions or assistance needed.    Patient has above noted diagnosis and/or medical co-morbidities.They are being treated and managed by their  Primary Care Physician for management of comorbid states which are deemed to be currently stable.        Disclaimer:  This note may have been prepared using voice recognition software, it may have not been extensively proofed, as such there could be errors within the text such as sound alike errors.       Report Electronically Signed By:     Emely Jimenez DPM   Podiatry  [facility]  9/9/2018

## 2018-09-07 ENCOUNTER — OFFICE VISIT (OUTPATIENT)
Dept: URGENT CARE | Facility: CLINIC | Age: 53
End: 2018-09-07
Payer: COMMERCIAL

## 2018-09-07 VITALS
BODY MASS INDEX: 33.73 KG/M2 | HEART RATE: 100 BPM | OXYGEN SATURATION: 98 % | RESPIRATION RATE: 17 BRPM | WEIGHT: 197.56 LBS | SYSTOLIC BLOOD PRESSURE: 98 MMHG | DIASTOLIC BLOOD PRESSURE: 72 MMHG | HEIGHT: 64 IN | TEMPERATURE: 99 F

## 2018-09-07 DIAGNOSIS — L50.9 URTICARIA: Primary | ICD-10-CM

## 2018-09-07 PROCEDURE — 99214 OFFICE O/P EST MOD 30 MIN: CPT | Mod: S$GLB,,, | Performed by: FAMILY MEDICINE

## 2018-09-07 PROCEDURE — 3008F BODY MASS INDEX DOCD: CPT | Mod: CPTII,S$GLB,, | Performed by: FAMILY MEDICINE

## 2018-09-07 PROCEDURE — 99999 PR PBB SHADOW E&M-EST. PATIENT-LVL III: CPT | Mod: PBBFAC,,, | Performed by: FAMILY MEDICINE

## 2018-09-07 RX ORDER — PREDNISONE 20 MG/1
40 TABLET ORAL DAILY
Qty: 10 TABLET | Refills: 0 | Status: SHIPPED | OUTPATIENT
Start: 2018-09-07 | End: 2018-09-12

## 2018-09-07 NOTE — PATIENT INSTRUCTIONS
1. Continue benedryl at night. Take zyrtec/allegra/claritin, one of these pills once daily.  2. Report this incident to your oncologist.   3. Discontinue the herbal cleansing pills       Hives (Adult)  Hives are pink or red bumps on the skin. These bumps are also known as wheals. The bumps can itch, burn, or sting. Hives can occur anywhere on the body. They vary in size and shape and can form in clusters. Individual hives can appear and go away quickly. New hives may develop as old ones fade. Hives are common and usually harmless. Occasionally hives are a sign of a serious allergy.  Hives are often caused by an allergic reaction. It may be an allergic reaction to foods such as fruit, shellfish, chocolate, nuts, or tomatoes. It may be a reaction to pollens, animal fur, or mold spores. Medicines, chemicals, and insect bites can also cause hives. And hives can be caused by hot sun or cold air. The cause of hives can be difficult to find.  You may be given medicines to relieve swelling and itching. Follow all instructions when using these medicines. The hives will usually fade in a few days, but can last up to 2 weeks.  Home care  Follow these tips:  · Try to find the cause of the hives and eliminate it. Discuss possible causes with your healthcare provider. Future reactions to the same allergen may be worse.  · Dont scratch the hives. Scratching will delay healing. To reduce itching, apply cool, wet compresses to the skin.  · Dress in soft, loose cotton clothing.  · Dont bathe in hot water. This can make the itching worse.  · Apply an ice pack or cool pack wrapped in a thin towel to your skin. This will help reduce redness and itching. But if your hives were caused by exposure to cold, then do not apply more cold to them.  · You may use over-the counter antihistamines to reduce itching. Some older antihistamines, such as diphenhydramine and chlorpheniramine, are inexpensive. But they need to be taken often and may  make you sleepy. They are best used at bedtime. Dont use diphenhydramine if you have glaucoma or have trouble urinating because of an enlarged prostate. Newer antihistamines, such as loratadine, cetirizine, and fexofenadine, are generally more expensive. But they tend to have fewer side effects, such as drowsiness. They can be taken less often.  · Another type of antihistamine is used to treat heartburn. This type includes ranitidine, nizatidine, famotidine, and cimetidine. These are sometimes used along with the above antihistamines if a single medicine is not working.  Follow-up care  Follow up with your healthcare provider if your symptoms don't get better in 2 days. Ask your provider about allergy testing if you have had a severe reaction, or have had several episodes of hives. He or she can use the allergy testing to find out what you are allergic to.  When to seek medical advice  Call your healthcare provider right away if any of these occur:  · Fever of 100.4°F (38.0°C) or higher, or as directed by your healthcare provider  · Redness, swelling, or pain  · Foul-smelling fluid coming from the rash  Call 911  Call 911 if any of the following occur:  · Swelling of the face, throat, or tongue  · Trouble breathing or swallowing  · Dizziness, weakness, or fainting  Date Last Reviewed: 9/1/2016  © 3455-3996 [employer]. [address] [place] [address], [place] [address]. All rights reserved. This information is not intended as a substitute for professional medical care. Always follow your healthcare professional's instructions.

## 2018-09-07 NOTE — PROGRESS NOTES
"Subjective:       Patient ID: Alma Rosa Carranza is a 53 y.o. female.    Chief Complaint: Allergic Reaction    BP 98/72 (BP Location: Left arm, Patient Position: Sitting, BP Method: Large (Manual))   Pulse 100   Temp 99 °F (37.2 °C) (Tympanic)   Resp 17   Ht 5' 4" (1.626 m)   Wt 89.6 kg (197 lb 8.5 oz)   SpO2 98%   BMI 33.91 kg/m²     HPI  Whelps various parts of body day 3. Had fever 100.9 last night. Throat a little tingling last night. Took benedryl pill last night. Today throat is better, whelps on right breast only. No unusual food. Started arimidex 6 days ago. Started a 3 day herbal cleansing regimen around same time.   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    Respiratory: Negative for cough, shortness of breath and wheezing.        Objective:      Physical Exam   Constitutional: She is oriented to person, place, and time. She appears well-developed and well-nourished. No distress.   HENT:   Head: Normocephalic and atraumatic.   Eyes: EOM are normal. Pupils are equal, round, and reactive to light.   Neck: Normal range of motion. Neck supple.   Cardiovascular: Normal rate.   Pulmonary/Chest: Effort normal. No respiratory distress.   Musculoskeletal: Normal range of motion.   Neurological: She is alert and oriented to person, place, and time. No cranial nerve deficit.   Skin: Skin is warm and dry. She is not diaphoretic.   Scatter macular erythema 2-3 cm in diameter on right breast. (+) pruritic   Nursing note and vitals reviewed.      Assessment:       1. Urticaria        Plan:     Alma Rosa was seen today for allergic reaction.    Diagnoses and all orders for this visit:    Urticaria  -     predniSONE (DELTASONE) 20 MG tablet; Take 2 tablets (40 mg total) by mouth once daily. for 5 days      Instructions  1. Continue benedryl at night. Take zyrtec/allegra/claritin, one of these pills once daily.  2. Report " this incident to your oncologist.   3. Discontinue the herbal cleansing pills       Discussed with pt/family all information and results pertaining to this visit. Discussed diagnosis and plan of treatment.  All questions and concerns were addressed at this time. Pt/family expresses understanding of information and instructions.  Care and follow up instruction provided.

## 2018-09-12 ENCOUNTER — PATIENT MESSAGE (OUTPATIENT)
Dept: GASTROENTEROLOGY | Facility: CLINIC | Age: 53
End: 2018-09-12

## 2018-09-12 ENCOUNTER — PATIENT MESSAGE (OUTPATIENT)
Dept: PODIATRY | Facility: CLINIC | Age: 53
End: 2018-09-12

## 2020-02-12 ENCOUNTER — HOSPITAL ENCOUNTER (OUTPATIENT)
Dept: RADIOLOGY | Facility: HOSPITAL | Age: 55
Discharge: HOME | End: 2020-02-12
Attending: PODIATRIST
Payer: COMMERCIAL

## 2020-02-12 ENCOUNTER — OFFICE VISIT (OUTPATIENT)
Dept: PODIATRY | Facility: CLINIC | Age: 55
End: 2020-02-12
Payer: COMMERCIAL

## 2020-02-12 VITALS
HEIGHT: 64 IN | BODY MASS INDEX: 33.98 KG/M2 | DIASTOLIC BLOOD PRESSURE: 79 MMHG | HEART RATE: 82 BPM | WEIGHT: 199.06 LBS | SYSTOLIC BLOOD PRESSURE: 138 MMHG

## 2020-02-12 DIAGNOSIS — M19.079 ARTHRITIS OF MIDFOOT: ICD-10-CM

## 2020-02-12 DIAGNOSIS — M79.671 RIGHT FOOT PAIN: ICD-10-CM

## 2020-02-12 DIAGNOSIS — M19.071 ARTHRITIS OF ANKLE OR FOOT, DEGENERATIVE, RIGHT: ICD-10-CM

## 2020-02-12 DIAGNOSIS — M79.671 RIGHT FOOT PAIN: Primary | ICD-10-CM

## 2020-02-12 DIAGNOSIS — M21.41 ACQUIRED PES PLANOVALGUS OF RIGHT FOOT: Primary | ICD-10-CM

## 2020-02-12 DIAGNOSIS — M25.571 SINUS TARSI SYNDROME OF RIGHT ANKLE: ICD-10-CM

## 2020-02-12 DIAGNOSIS — Z96.9 RETAINED ORTHOPEDIC HARDWARE: ICD-10-CM

## 2020-02-12 PROCEDURE — 3008F BODY MASS INDEX DOCD: CPT | Mod: CPTII,S$GLB,, | Performed by: PODIATRIST

## 2020-02-12 PROCEDURE — 73630 X-RAY EXAM OF FOOT: CPT | Mod: TC,RT

## 2020-02-12 PROCEDURE — 20600 PR DRAIN/INJECT SMALL JOINT/BURSA: ICD-10-PCS | Mod: RT,S$GLB,, | Performed by: PODIATRIST

## 2020-02-12 PROCEDURE — 73630 XR FOOT COMPLETE 3 VIEW RIGHT: ICD-10-PCS | Mod: 26,RT,, | Performed by: RADIOLOGY

## 2020-02-12 PROCEDURE — 73630 X-RAY EXAM OF FOOT: CPT | Mod: 26,RT,, | Performed by: RADIOLOGY

## 2020-02-12 PROCEDURE — 99214 PR OFFICE/OUTPT VISIT, EST, LEVL IV, 30-39 MIN: ICD-10-PCS | Mod: 25,S$GLB,, | Performed by: PODIATRIST

## 2020-02-12 PROCEDURE — 99999 PR PBB SHADOW E&M-EST. PATIENT-LVL III: ICD-10-PCS | Mod: PBBFAC,,, | Performed by: PODIATRIST

## 2020-02-12 PROCEDURE — 3008F PR BODY MASS INDEX (BMI) DOCUMENTED: ICD-10-PCS | Mod: CPTII,S$GLB,, | Performed by: PODIATRIST

## 2020-02-12 PROCEDURE — 20600 DRAIN/INJ JOINT/BURSA W/O US: CPT | Mod: RT,S$GLB,, | Performed by: PODIATRIST

## 2020-02-12 PROCEDURE — 99214 OFFICE O/P EST MOD 30 MIN: CPT | Mod: 25,S$GLB,, | Performed by: PODIATRIST

## 2020-02-12 PROCEDURE — 99999 PR PBB SHADOW E&M-EST. PATIENT-LVL III: CPT | Mod: PBBFAC,,, | Performed by: PODIATRIST

## 2020-02-12 RX ORDER — TRIAMCINOLONE ACETONIDE 40 MG/ML
40 INJECTION, SUSPENSION INTRA-ARTICULAR; INTRAMUSCULAR ONCE
Status: COMPLETED | OUTPATIENT
Start: 2020-02-12 | End: 2020-02-12

## 2020-02-12 RX ADMIN — TRIAMCINOLONE ACETONIDE 40 MG: 40 INJECTION, SUSPENSION INTRA-ARTICULAR; INTRAMUSCULAR at 04:02

## 2020-02-12 NOTE — PROGRESS NOTES
"   PODIATRIC MEDICINE AND SURGERY      CHIEF COMPLAINT  Chief Complaint   Patient presents with    Heel Pain     Bilateral heel pain. Right more than left. Previous foot surgery to site for plantar fasciitis performed by Dr. Jo in 2018.         HPI:    Alma Rosa Carranza is a 55 y.o. female who complains of pain to right foot and heel . Pt states she had surgical intervention by Dr. Beaver last year to restore her "arch" in which she has hardware implanted and also underwent several tendon procedures. She states pain improved but still present. She did receive injection several months ago by Dr. Beaver which helped. She relates pain present with ambulation as she is  and works at elementary. She does have orthotics and tennis shoes but does not wear due to pain. She rates pain 7/10. She has no further pedal complaints today.    PMH  Past Medical History:   Diagnosis Date    Arthralgia         PSH  Past Surgical History:   Procedure Laterality Date    FOOT SURGERY Bilateral     with Hardware Placement    MODIFIED RADICAL MASTECTOMY W/ AXILLARY LYMPH NODE DISSECTION Right 07/23/2018    TUBAL LIGATION          MEDS  Current Outpatient Medications on File Prior to Visit   Medication Sig Dispense Refill    acetaminophen (TYLENOL) 500 MG tablet Take 1,000 mg by mouth as needed.      ergocalciferol, vitamin D2, (VITAMIN D ORAL) Take 1,000 mg by mouth once daily.      Lactobacillus acidophilus (PROBIOTIC ACIDOPHILUS ORAL) Take 1 capsule by mouth once daily.      pantoprazole (PROTONIX) 40 MG tablet Take 1 tablet (40 mg total) by mouth once daily. 30 tablet 4    pedi multivit no.19-folic acid (CHILDREN'S MULTI-VIT GUMMIES) 200 mcg Chew Take by mouth.      SCAR CARE BASE ENHANCED Gel Apply 0.5 g topically 2 (two) times daily.  3    diphenhydramine-acetaminophen (TYLENOL PM)  mg Tab Take 1 tablet by mouth nightly as needed.      melatonin 5 mg Cap Take 2 capsules by mouth nightly as needed.  "      No current facility-administered medications on file prior to visit.       Medication List with Changes/Refills   Current Medications    ACETAMINOPHEN (TYLENOL) 500 MG TABLET    Take 1,000 mg by mouth as needed.    DIPHENHYDRAMINE-ACETAMINOPHEN (TYLENOL PM)  MG TAB    Take 1 tablet by mouth nightly as needed.    ERGOCALCIFEROL, VITAMIN D2, (VITAMIN D ORAL)    Take 1,000 mg by mouth once daily.    LACTOBACILLUS ACIDOPHILUS (PROBIOTIC ACIDOPHILUS ORAL)    Take 1 capsule by mouth once daily.    MELATONIN 5 MG CAP    Take 2 capsules by mouth nightly as needed.    PANTOPRAZOLE (PROTONIX) 40 MG TABLET    Take 1 tablet (40 mg total) by mouth once daily.    PEDI MULTIVIT NO.19-FOLIC ACID (CHILDREN'S MULTI-VIT GUMMIES) 200 MCG CHEW    Take by mouth.    SCAR CARE BASE ENHANCED GEL    Apply 0.5 g topically 2 (two) times daily.       ALLG  Review of patient's allergies indicates:   Allergen Reactions    Clindamycin Nausea And Vomiting    Hydrocodone Nausea And Vomiting       SOCIAL Hx  Social History     Socioeconomic History    Marital status: Single     Spouse name: Not on file    Number of children: 3    Years of education: Not on file    Highest education level: Not on file   Occupational History    Occupation: Paraprofesional     Employer: [employer]     Comment: [employer]   Social Needs    Financial resource strain: Not on file    Food insecurity:     Worry: Not on file     Inability: Not on file    Transportation needs:     Medical: Not on file     Non-medical: Not on file   Tobacco Use    Smoking status: Never Smoker    Smokeless tobacco: Never Used   Substance and Sexual Activity    Alcohol use: Yes     Frequency: 2-4 times a month     Comment: Occasionally    Drug use: No    Sexual activity: Yes     Partners: Male     Birth control/protection: See Surgical Hx     Comment: 1 partner   Lifestyle    Physical activity:     Days per week: Not on file     Minutes per  "session: Not on file    Stress: Not on file   Relationships    Social connections:     Talks on phone: Not on file     Gets together: Not on file     Attends [employer] service: Not on file     Active member of club or organization: Not on file     Attends meetings of clubs or organizations: Not on file     Relationship status: Not on file   Other Topics Concern    Are you pregnant or think you may be? Not Asked    Breast-feeding Not Asked   Social History Narrative    No smokers or pets in household.       FAM Hx  Family History   Problem Relation Age of Onset    Cancer Mother         Bladder  cancer    Breast cancer Mother     Hypertension Father     Cancer Sister         Breast with mets    Breast cancer Sister     Heart failure Paternal Grandfather     Cancer Maternal Grandmother         Colon cancer    Colon cancer Paternal Grandmother     Glaucoma Maternal Aunt     Cataracts Maternal Aunt     Macular degeneration Maternal Aunt     Heart disease Neg Hx         CAD    Stroke Neg Hx     Diabetes Neg Hx        Review of systems:  The patient denies nausea, vomiting, fevers, chills, shortness of breath, chest pain, and calf pain.      OBJECTIVE:  Vitals:    02/12/20 1502   BP: 138/79   Pulse: 82   Weight: 90.3 kg (199 lb 1.2 oz)   Height: 5' 4" (1.626 m)   PainSc:   7   PainLoc: Foot       LOWER EXTREMITY  VASCULAR  Dorsalis pedis and posterior tibial pulses palpable 2/4 bilaterally.   Capillary refill time immediate to the toes.   Feet are warm to the touch. Skin temperature warm to warm from proximally to distally   There are no varicosities, telangiectasias noted to bilateral foot and ankle regions.   There are no ecchymoses noted to bilateral foot and ankle regions.   There is gross lower extremity edema.    DERMATOLOGIC  Skin moist with healthy texture and turgor.  There are no open ulcerations, lacerations, or fissures to bilateral foot and ankle regions. There are no signs of infection as " there is no erythema, no proximal-extending lymphangiitis, no fluctuance, or crepitus noted on palpation to bilateral foot and ankle regions.   There is no interdigital maceration.   There are no hyperkeratotic lesions noted to feet. Nails are well-trimmed.  Well healed cictriax dorsal aspect medial foot and lateral aspect     NEUROLOGIC  Epicritic sensation is intact as the patient is able to sense light touch to bilateral foot and ankle regions. There is a negative Tinel's sign on percussion of the tibial nerve, dorsal cutaneous nerves, sural nerves of the RIGHT foot.  Achilles and patellar deep tendon reflexes intact  Babinski reflex absent    ORTHOPEDIC/BIOMECHANICAL  There is Pain on palpation plantar plantar lateral aspect of  calcaneus, RIGHT foot.   There is pain with palpation of scar on lateral foot and with palpation of sinus tarsi  No pain with medial-lateral compression of calcaneus.  5/5 muscle strength in all 4 quadrants.   Ankle joint range of motion decreased with knee extended and flexed b/l.  Maximally pronated foot type    IMAGING   Reviewed by me and I agree with radiologist findings, 3 views of foot/ankle, reveal:  No results found for this or any previous visit.        No results found for this or any previous visit.    No results found for this or any previous visit.     Results for orders placed during the hospital encounter of 04/03/18   X-Ray Foot Complete Right    Narrative EXAMINATION:  XR FOOT COMPLETE 3 VIEW RIGHT    CLINICAL HISTORY:  pt had surgery with plate implant 2015 and it has loosened up or broken.;. Pain in right foot    TECHNIQUE:  AP, lateral, and oblique views of the right foot were performed.    COMPARISON:  None    FINDINGS:  Postsurgical changes with plate and screw fixation are noted about the 1st tarsometatarsal joint.  There is evidence of hardware failure with fracture of the three more proximal screws.  (There is a screw fragment seen within the dorsal soft tissues   of the midfoot.  Two additional screws have also fractured migrated dorsally).  There are degenerative changes at the junction of the hindfoot and midfoot as well as within the midfoot with some soft tissue prominence about the forefoot region.  Pes planus deformity is noted.  No acute fracture or dislocation is seen.      Impression As above      Electronically signed by: Edgardo Hemphill MD  Date:    04/03/2018  Time:    10:50         ASSESSMENT     Encounter Diagnoses   Name Primary?    Acquired pes planovalgus of right foot Yes    Arthritis of midfoot     Retained orthopedic hardware     Sinus tarsi syndrome of right ankle     Arthritis of ankle or foot, degenerative, right          PLAN:   1. The patient was examined and evaluated   2. Treatment options were discussed in detail; the patient elected to undergo conservative treatment. I Reviewed films with patient, will order new xray  . Acquired pes planovalgus of right foot    Arthritis of midfoot    Retained orthopedic hardware    Sinus tarsi syndrome of right ankle    Arthritis of ankle or foot, degenerative, right    Other orders  -     triamcinolone acetonide injection 40 mg        Discussed severe pronated foot leading to instability and pain/impingment in sinus tarsi. Recommend steroid injection at sinus tarsi.  A discussion of the risks, benefits, and alternatives of the corticosteroid injection  occurred.  All questions were answered.  After gaining oral consent and verifying the injection site, the skin was prepped with betadine, then alcohol swabs.  Under aseptic conditions, the right sinus tarsi joint was injected with a 2 mL 1:1 mixture of 0.5% Marcaine and kenalog 40. The area was cleansed and then covered with a band-aid . The patient tolerated the injection well and no apparent adverse reactions observed.    A discussion regarding the time course of the medications was undertaken and questions were answered.  Patient counseled to look for  signs of infection and if any report to Emergency Department    Will order foot xrays and f/u via telephone. Recommend power steps and wider tennis shoes such as new balance- Rx provided.    No future appointments.    Report Electronically Signed By:    Emely Jimenez DPM   Podiatric Medicine & Surgery  [facility]  2/12/2020    Disclaimer: This note was partially prepared using a voice recognition system and is likely to have sound alike errors within the text.

## 2020-02-13 ENCOUNTER — PATIENT MESSAGE (OUTPATIENT)
Dept: PODIATRY | Facility: CLINIC | Age: 55
End: 2020-02-13

## 2020-02-15 ENCOUNTER — PATIENT MESSAGE (OUTPATIENT)
Dept: PODIATRY | Facility: CLINIC | Age: 55
End: 2020-02-15

## 2020-10-08 ENCOUNTER — OFFICE VISIT (OUTPATIENT)
Dept: PODIATRY | Facility: CLINIC | Age: 55
End: 2020-10-08
Payer: COMMERCIAL

## 2020-10-08 VITALS
HEIGHT: 64 IN | BODY MASS INDEX: 33.98 KG/M2 | SYSTOLIC BLOOD PRESSURE: 142 MMHG | DIASTOLIC BLOOD PRESSURE: 83 MMHG | HEART RATE: 85 BPM | WEIGHT: 199.06 LBS

## 2020-10-08 DIAGNOSIS — M19.079 ARTHRITIS OF MIDFOOT: ICD-10-CM

## 2020-10-08 DIAGNOSIS — M76.821 POSTERIOR TIBIAL TENDONITIS OF RIGHT LEG: Primary | ICD-10-CM

## 2020-10-08 DIAGNOSIS — M21.41 ACQUIRED PES PLANOVALGUS OF RIGHT FOOT: ICD-10-CM

## 2020-10-08 PROCEDURE — 99999 PR PBB SHADOW E&M-EST. PATIENT-LVL III: CPT | Mod: PBBFAC,,, | Performed by: PODIATRIST

## 2020-10-08 PROCEDURE — 99214 PR OFFICE/OUTPT VISIT, EST, LEVL IV, 30-39 MIN: ICD-10-PCS | Mod: S$GLB,,, | Performed by: PODIATRIST

## 2020-10-08 PROCEDURE — 99214 OFFICE O/P EST MOD 30 MIN: CPT | Mod: S$GLB,,, | Performed by: PODIATRIST

## 2020-10-08 PROCEDURE — 3008F PR BODY MASS INDEX (BMI) DOCUMENTED: ICD-10-PCS | Mod: CPTII,S$GLB,, | Performed by: PODIATRIST

## 2020-10-08 PROCEDURE — 3008F BODY MASS INDEX DOCD: CPT | Mod: CPTII,S$GLB,, | Performed by: PODIATRIST

## 2020-10-08 PROCEDURE — 99999 PR PBB SHADOW E&M-EST. PATIENT-LVL III: ICD-10-PCS | Mod: PBBFAC,,, | Performed by: PODIATRIST

## 2020-10-08 RX ORDER — METHYLPREDNISOLONE 4 MG/1
TABLET ORAL
Qty: 1 PACKAGE | Refills: 0 | Status: SHIPPED | OUTPATIENT
Start: 2020-10-08 | End: 2020-10-29

## 2020-10-08 RX ORDER — ANASTROZOLE 1 MG/1
1 TABLET ORAL
COMMUNITY
Start: 2020-06-19

## 2020-10-08 NOTE — PROGRESS NOTES
"   PODIATRIC MEDICINE AND SURGERY      CHIEF COMPLAINT  Chief Complaint   Patient presents with    Foot Pain     Pt complains of right foot pain 10/10         HPI:    Alma Rosa Carranza is a 55 y.o. female who complains of pain to right foot and heel . Pt states she had surgical intervention by Dr. Beaver last year to restore her "arch" in which she has hardware implanted and also underwent several tendon procedures. She did have lapidus procedure with revisional surgery. Her pain currently is not along sinus tarsi or bunion surgery site. Pt presents to clinic today with pain posterior right heel and medial ankle. Pt states pain flared up 3 weeks ago. She denies any trauma to site. She states pain extends to plantar aspect of right heel.  Symptoms are 10/10 on pain scale. She has tried pain medication for relief.     PMH  Past Medical History:   Diagnosis Date    Arthralgia         PSH  Past Surgical History:   Procedure Laterality Date    FOOT SURGERY Bilateral     with Hardware Placement    MODIFIED RADICAL MASTECTOMY W/ AXILLARY LYMPH NODE DISSECTION Right 07/23/2018    TUBAL LIGATION          MEDS  Current Outpatient Medications on File Prior to Visit   Medication Sig Dispense Refill    acetaminophen (TYLENOL) 500 MG tablet Take 1,000 mg by mouth as needed.      anastrozole (ARIMIDEX) 1 mg Tab Take 1 mg by mouth.      diphenhydramine-acetaminophen (TYLENOL PM)  mg Tab Take 1 tablet by mouth nightly as needed.      ergocalciferol, vitamin D2, (VITAMIN D ORAL) Take 1,000 mg by mouth once daily.      Lactobacillus acidophilus (PROBIOTIC ACIDOPHILUS ORAL) Take 1 capsule by mouth once daily.      melatonin 5 mg Cap Take 2 capsules by mouth nightly as needed.      pantoprazole (PROTONIX) 40 MG tablet Take 1 tablet (40 mg total) by mouth once daily. 30 tablet 4    pedi multivit no.19-folic acid (CHILDREN'S MULTI-VIT GUMMIES) 200 mcg Chew Take by mouth.      SCAR CARE BASE ENHANCED Gel Apply 0.5 g " topically 2 (two) times daily.  3     No current facility-administered medications on file prior to visit.       Medication List with Changes/Refills   New Medications    METHYLPREDNISOLONE (MEDROL DOSEPACK) 4 MG TABLET    use as directed   Current Medications    ACETAMINOPHEN (TYLENOL) 500 MG TABLET    Take 1,000 mg by mouth as needed.    ANASTROZOLE (ARIMIDEX) 1 MG TAB    Take 1 mg by mouth.    DIPHENHYDRAMINE-ACETAMINOPHEN (TYLENOL PM)  MG TAB    Take 1 tablet by mouth nightly as needed.    ERGOCALCIFEROL, VITAMIN D2, (VITAMIN D ORAL)    Take 1,000 mg by mouth once daily.    LACTOBACILLUS ACIDOPHILUS (PROBIOTIC ACIDOPHILUS ORAL)    Take 1 capsule by mouth once daily.    MELATONIN 5 MG CAP    Take 2 capsules by mouth nightly as needed.    PANTOPRAZOLE (PROTONIX) 40 MG TABLET    Take 1 tablet (40 mg total) by mouth once daily.    PEDI MULTIVIT NO.19-FOLIC ACID (CHILDREN'S MULTI-VIT GUMMIES) 200 MCG CHEW    Take by mouth.    SCAR CARE BASE ENHANCED GEL    Apply 0.5 g topically 2 (two) times daily.       ALLG  Review of patient's allergies indicates:   Allergen Reactions    Clindamycin Nausea And Vomiting    Hydrocodone Nausea And Vomiting       SOCIAL Hx  Social History     Socioeconomic History    Marital status: Single     Spouse name: Not on file    Number of children: 3    Years of education: Not on file    Highest education level: Not on file   Occupational History    Occupation: Paraprofesional     Employer: [employer]     Comment: [employer]   Social Needs    Financial resource strain: Not on file    Food insecurity     Worry: Not on file     Inability: Not on file    Transportation needs     Medical: Not on file     Non-medical: Not on file   Tobacco Use    Smoking status: Never Smoker    Smokeless tobacco: Never Used   Substance and Sexual Activity    Alcohol use: Yes     Frequency: 2-4 times a month     Comment: Occasionally    Drug use: No    Sexual  "activity: Yes     Partners: Male     Birth control/protection: See Surgical Hx     Comment: 1 partner   Lifestyle    Physical activity     Days per week: Not on file     Minutes per session: Not on file    Stress: Not on file   Relationships    Social connections     Talks on phone: Not on file     Gets together: Not on file     Attends Church service: Not on file     Active member of club or organization: Not on file     Attends meetings of clubs or organizations: Not on file     Relationship status: Not on file   Other Topics Concern    Are you pregnant or think you may be? Not Asked    Breast-feeding Not Asked   Social History Narrative    No smokers or pets in household.       FAM Hx  Family History   Problem Relation Age of Onset    Cancer Mother         Bladder  cancer    Breast cancer Mother     Hypertension Father     Cancer Sister         Breast with mets    Breast cancer Sister     Heart failure Paternal Grandfather     Cancer Maternal Grandmother         Colon cancer    Colon cancer Paternal Grandmother     Glaucoma Maternal Aunt     Cataracts Maternal Aunt     Macular degeneration Maternal Aunt     Heart disease Neg Hx         CAD    Stroke Neg Hx     Diabetes Neg Hx        Review of systems:  The patient denies nausea, vomiting, fevers, chills, shortness of breath, chest pain, and calf pain.      OBJECTIVE:  Vitals:    10/08/20 1606   BP: (!) 142/83   Pulse: 85   Weight: 90.3 kg (199 lb 1.2 oz)   Height: 5' 4" (1.626 m)       LOWER EXTREMITY  VASCULAR  Dorsalis pedis and posterior tibial pulses palpable 2/4 bilaterally.   Capillary refill time immediate to the toes.   Feet are warm to the touch. Skin temperature warm to warm from proximally to distally   There are no varicosities, telangiectasias noted to bilateral foot and ankle regions.   There are no ecchymoses noted to bilateral foot and ankle regions.   There is gross lower extremity edema.    DERMATOLOGIC  Skin moist with " healthy texture and turgor.  There are no open ulcerations, lacerations, or fissures to bilateral foot and ankle regions. There are no signs of infection as there is no erythema, no proximal-extending lymphangiitis, no fluctuance, or crepitus noted on palpation to bilateral foot and ankle regions.   There is no interdigital maceration.   There are no hyperkeratotic lesions noted to feet. Nails are well-trimmed.  Well healed cictriax dorsal aspect medial foot and lateral aspect     NEUROLOGIC  Epicritic sensation is intact as the patient is able to sense light touch to bilateral foot and ankle regions. There is a negative Tinel's sign on percussion of the tibial nerve, dorsal cutaneous nerves, sural nerves of the RIGHT foot.  Achilles and patellar deep tendon reflexes intact  Babinski reflex absent    ORTHOPEDIC/BIOMECHANICAL  There is Pain on palpation plantar plantar lateral aspect of  calcaneus, RIGHT foot.   There is pain achilles insertion RIGHT  There is pain along posterior tibial tendon RIGHT   No pain with medial-lateral compression of calcaneus.  5/5 muscle strength in all 4 quadrants.   Ankle joint range of motion decreased with knee extended and flexed b/l.  Maximally pronated foot type    IMAGING   Reviewed by me and I agree with radiologist findings, 3 views of foot/ankle, reveal:      Results for orders placed during the hospital encounter of 04/03/18   X-Ray Foot Complete Right    Narrative EXAMINATION:  XR FOOT COMPLETE 3 VIEW RIGHT    CLINICAL HISTORY:  pt had surgery with plate implant 2015 and it has loosened up or broken.;. Pain in right foot    TECHNIQUE:  AP, lateral, and oblique views of the right foot were performed.    COMPARISON:  None    FINDINGS:  Postsurgical changes with plate and screw fixation are noted about the 1st tarsometatarsal joint.  There is evidence of hardware failure with fracture of the three more proximal screws.  (There is a screw fragment seen within the dorsal soft  tissues  of the midfoot.  Two additional screws have also fractured migrated dorsally).  There are degenerative changes at the junction of the hindfoot and midfoot as well as within the midfoot with some soft tissue prominence about the forefoot region.  Pes planus deformity is noted.  No acute fracture or dislocation is seen.      Impression As above      Electronically signed by: Edgardo Hemphill MD  Date:    04/03/2018  Time:    10:50         ASSESSMENT     Encounter Diagnoses   Name Primary?    Posterior tibial tendonitis of right leg Yes    Acquired pes planovalgus of right foot     Arthritis of midfoot          PLAN:   1. The patient was examined and evaluated   2. Treatment options were discussed in detail; the patient elected to undergo conservative treatment. I Reviewed films with patient, will order new xray  . Posterior tibial tendonitis of right leg    Acquired pes planovalgus of right foot    Arthritis of midfoot    Other orders  -     methylPREDNISolone (MEDROL DOSEPACK) 4 mg tablet; use as directed  Dispense: 1 Package; Refill: 0      CAM boot  Repeat Xray f/u visit  Medrol dosepak    Future Appointments   Date Time Provider Department Center   11/5/2020  4:00 PM Emely Jimenez DPM [facility] POD [place]       Report Electronically Signed By:    Emely Jimenez DPM   Podiatric Medicine & Surgery  [facility]  10/8/2020    Disclaimer: This note was partially prepared using a voice recognition system and is likely to have sound alike errors within the text.

## 2020-10-08 NOTE — LETTER
October 8, 2020      [facility]  [address]  [place] [address]  Phone: [phone]  Fax: [phone]       Patient: Alma Rosa Carranza   YOB: 1965  Date of Visit: 10/08/2020    To Whom It May Concern:    Christy Carranza  was at [facility] on 10/08/2020. She may return to work 10/13/2020 on with restrictions of wearing walking boot until follow up appointment. If you have any questions or concerns, or if I can be of further assistance, please do not hesitate to contact me.    Sincerely,      Maryjo Swann MA

## 2020-11-05 ENCOUNTER — OFFICE VISIT (OUTPATIENT)
Dept: PODIATRY | Facility: CLINIC | Age: 55
End: 2020-11-05
Payer: COMMERCIAL

## 2020-11-05 VITALS
BODY MASS INDEX: 33.98 KG/M2 | HEIGHT: 64 IN | HEART RATE: 84 BPM | WEIGHT: 199.06 LBS | DIASTOLIC BLOOD PRESSURE: 92 MMHG | SYSTOLIC BLOOD PRESSURE: 142 MMHG

## 2020-11-05 DIAGNOSIS — M79.671 FOOT ARCH PAIN, RIGHT: ICD-10-CM

## 2020-11-05 DIAGNOSIS — M21.41 ACQUIRED PES PLANOVALGUS OF RIGHT FOOT: ICD-10-CM

## 2020-11-05 DIAGNOSIS — M76.821 POSTERIOR TIBIAL TENDONITIS OF RIGHT LEG: Primary | ICD-10-CM

## 2020-11-05 DIAGNOSIS — M79.672 ARCH PAIN OF LEFT FOOT: ICD-10-CM

## 2020-11-05 PROCEDURE — 99999 PR PBB SHADOW E&M-EST. PATIENT-LVL III: ICD-10-PCS | Mod: PBBFAC,,, | Performed by: PODIATRIST

## 2020-11-05 PROCEDURE — 3008F BODY MASS INDEX DOCD: CPT | Mod: CPTII,S$GLB,, | Performed by: PODIATRIST

## 2020-11-05 PROCEDURE — 99214 PR OFFICE/OUTPT VISIT, EST, LEVL IV, 30-39 MIN: ICD-10-PCS | Mod: S$GLB,,, | Performed by: PODIATRIST

## 2020-11-05 PROCEDURE — 99214 OFFICE O/P EST MOD 30 MIN: CPT | Mod: S$GLB,,, | Performed by: PODIATRIST

## 2020-11-05 PROCEDURE — 3008F PR BODY MASS INDEX (BMI) DOCUMENTED: ICD-10-PCS | Mod: CPTII,S$GLB,, | Performed by: PODIATRIST

## 2020-11-05 PROCEDURE — 99999 PR PBB SHADOW E&M-EST. PATIENT-LVL III: CPT | Mod: PBBFAC,,, | Performed by: PODIATRIST

## 2020-11-05 RX ORDER — VIT C/E/ZN/COPPR/LUTEIN/ZEAXAN 250MG-90MG
CAPSULE ORAL
COMMUNITY

## 2020-11-05 NOTE — PROGRESS NOTES
"     PODIATRIC MEDICINE AND SURGERY      CHIEF COMPLAINT  Chief Complaint   Patient presents with    Follow-up     4 wk F/U PTTD 4/10. Pt states that she is now having left foot pain 8/10         HPI:    Alma Rosa Carranza is a 55 y.o. female who complains of pain to right foot and heel . Pt states she had surgical intervention by Dr. Beaver last year to restore her "arch" in which she has hardware implanted and also underwent several tendon procedures. She did have lapidus procedure with revisional surgery. Her pain currently is not along sinus tarsi or bunion surgery site. Pt presents to clinic today with pain posterior right heel and medial ankle. Pt states pain flared up 3 weeks ago. She denies any trauma to site. She states pain extends to plantar aspect of right heel.  Symptoms are 10/10 on pain scale. She has tried pain medication for relief.     Pt is here 4 week follow up right foot pain. Right foot pain has resolved but states she now has LEFT foot pain. Pain localized to medial arch.s ymptoms are 10/10 with ambulation barefoot. No further pedal complaints.     PMH  Past Medical History:   Diagnosis Date    Arthralgia         PSH  Past Surgical History:   Procedure Laterality Date    FOOT SURGERY Bilateral     with Hardware Placement    MODIFIED RADICAL MASTECTOMY W/ AXILLARY LYMPH NODE DISSECTION Right 07/23/2018    TUBAL LIGATION          MEDS  Current Outpatient Medications on File Prior to Visit   Medication Sig Dispense Refill    acetaminophen (TYLENOL) 500 MG tablet Take 1,000 mg by mouth as needed.      anastrozole (ARIMIDEX) 1 mg Tab Take 1 mg by mouth.      cholecalciferol, vitamin D3, (VITAMIN D3) 25 mcg (1,000 unit) capsule Vitamin D3 25 mcg (1,000 unit) capsule   Take by oral route.      diphenhydramine-acetaminophen (TYLENOL PM)  mg Tab Take 1 tablet by mouth nightly as needed.      ergocalciferol, vitamin D2, (VITAMIN D ORAL) Take 1,000 mg by mouth once daily.      " Lactobacillus acidophilus (PROBIOTIC ACIDOPHILUS ORAL) Take 1 capsule by mouth once daily.      melatonin 5 mg Cap Take 2 capsules by mouth nightly as needed.      pantoprazole (PROTONIX) 40 MG tablet Take 1 tablet (40 mg total) by mouth once daily. 30 tablet 4    pedi multivit no.19-folic acid (CHILDREN'S MULTI-VIT GUMMIES) 200 mcg Chew Take by mouth.      SCAR CARE BASE ENHANCED Gel Apply 0.5 g topically 2 (two) times daily.  3     No current facility-administered medications on file prior to visit.       Medication List with Changes/Refills   Current Medications    ACETAMINOPHEN (TYLENOL) 500 MG TABLET    Take 1,000 mg by mouth as needed.    ANASTROZOLE (ARIMIDEX) 1 MG TAB    Take 1 mg by mouth.    CHOLECALCIFEROL, VITAMIN D3, (VITAMIN D3) 25 MCG (1,000 UNIT) CAPSULE    Vitamin D3 25 mcg (1,000 unit) capsule   Take by oral route.    DIPHENHYDRAMINE-ACETAMINOPHEN (TYLENOL PM)  MG TAB    Take 1 tablet by mouth nightly as needed.    ERGOCALCIFEROL, VITAMIN D2, (VITAMIN D ORAL)    Take 1,000 mg by mouth once daily.    LACTOBACILLUS ACIDOPHILUS (PROBIOTIC ACIDOPHILUS ORAL)    Take 1 capsule by mouth once daily.    MELATONIN 5 MG CAP    Take 2 capsules by mouth nightly as needed.    PANTOPRAZOLE (PROTONIX) 40 MG TABLET    Take 1 tablet (40 mg total) by mouth once daily.    PEDI MULTIVIT NO.19-FOLIC ACID (CHILDREN'S MULTI-VIT GUMMIES) 200 MCG CHEW    Take by mouth.    SCAR CARE BASE ENHANCED GEL    Apply 0.5 g topically 2 (two) times daily.       ALLG  Review of patient's allergies indicates:   Allergen Reactions    Clindamycin Nausea And Vomiting    Hydrocodone Nausea And Vomiting       SOCIAL Hx  Social History     Socioeconomic History    Marital status: Single     Spouse name: Not on file    Number of children: 3    Years of education: Not on file    Highest education level: Not on file   Occupational History    Occupation: Paraprofesional     Employer: [employer]     Comment:  "[employer]   Social Needs    Financial resource strain: Not on file    Food insecurity     Worry: Not on file     Inability: Not on file    Transportation needs     Medical: Not on file     Non-medical: Not on file   Tobacco Use    Smoking status: Never Smoker    Smokeless tobacco: Never Used   Substance and Sexual Activity    Alcohol use: Yes     Frequency: 2-4 times a month     Comment: Occasionally    Drug use: No    Sexual activity: Yes     Partners: Male     Birth control/protection: See Surgical Hx     Comment: 1 partner   Lifestyle    Physical activity     Days per week: Not on file     Minutes per session: Not on file    Stress: Not on file   Relationships    Social connections     Talks on phone: Not on file     Gets together: Not on file     Attends Confucianism service: Not on file     Active member of club or organization: Not on file     Attends meetings of clubs or organizations: Not on file     Relationship status: Not on file   Other Topics Concern    Are you pregnant or think you may be? Not Asked    Breast-feeding Not Asked   Social History Narrative    No smokers or pets in household.       FAM Hx  Family History   Problem Relation Age of Onset    Cancer Mother         Bladder  cancer    Breast cancer Mother     Hypertension Father     Cancer Sister         Breast with mets    Breast cancer Sister     Heart failure Paternal Grandfather     Cancer Maternal Grandmother         Colon cancer    Colon cancer Paternal Grandmother     Glaucoma Maternal Aunt     Cataracts Maternal Aunt     Macular degeneration Maternal Aunt     Heart disease Neg Hx         CAD    Stroke Neg Hx     Diabetes Neg Hx        Review of systems:  The patient denies nausea, vomiting, fevers, chills, shortness of breath, chest pain, and calf pain.      OBJECTIVE:  Vitals:    11/05/20 1530   BP: (!) 142/92   Pulse: 84   Weight: 90.3 kg (199 lb 1.2 oz)   Height: 5' 4" (1.626 m)       LOWER " EXTREMITY  VASCULAR  Dorsalis pedis and posterior tibial pulses palpable 2/4 bilaterally.   Capillary refill time immediate to the toes.   Feet are warm to the touch. Skin temperature warm to warm from proximally to distally   There are no varicosities, telangiectasias noted to bilateral foot and ankle regions.   There are no ecchymoses noted to bilateral foot and ankle regions.   There is gross lower extremity edema.    DERMATOLOGIC  Skin moist with healthy texture and turgor.  There are no open ulcerations, lacerations, or fissures to bilateral foot and ankle regions. There are no signs of infection as there is no erythema, no proximal-extending lymphangiitis, no fluctuance, or crepitus noted on palpation to bilateral foot and ankle regions.   There is no interdigital maceration.   There are no hyperkeratotic lesions noted to feet. Nails are well-trimmed.  Well healed cictriax dorsal aspect medial foot and lateral aspect     NEUROLOGIC  Epicritic sensation is intact as the patient is able to sense light touch to bilateral foot and ankle regions. There is a negative Tinel's sign on percussion of the tibial nerve, dorsal cutaneous nerves, sural nerves of the RIGHT foot.  Achilles and patellar deep tendon reflexes intact  Babinski reflex absent    ORTHOPEDIC/BIOMECHANICAL  There is Pain on palpation plantar plantar lateral aspect of  calcaneus, RIGHT foot.   There is pain achilles insertion RIGHT  There is pain along posterior tibial tendon LEFT  No pain with medial-lateral compression of calcaneus.  5/5 muscle strength in all 4 quadrants.   Ankle joint range of motion decreased with knee extended and flexed b/l.  Maximally pronated foot type    IMAGING   Reviewed by me and I agree with radiologist findings, 3 views of foot/ankle, reveal:      Results for orders placed during the hospital encounter of 04/03/18   X-Ray Foot Complete Right    Narrative EXAMINATION:  XR FOOT COMPLETE 3 VIEW RIGHT    CLINICAL  HISTORY:  pt had surgery with plate implant 2015 and it has loosened up or broken.;. Pain in right foot    TECHNIQUE:  AP, lateral, and oblique views of the right foot were performed.    COMPARISON:  None    FINDINGS:  Postsurgical changes with plate and screw fixation are noted about the 1st tarsometatarsal joint.  There is evidence of hardware failure with fracture of the three more proximal screws.  (There is a screw fragment seen within the dorsal soft tissues  of the midfoot.  Two additional screws have also fractured migrated dorsally).  There are degenerative changes at the junction of the hindfoot and midfoot as well as within the midfoot with some soft tissue prominence about the forefoot region.  Pes planus deformity is noted.  No acute fracture or dislocation is seen.      Impression As above      Electronically signed by: Edgardo Hemphill MD  Date:    04/03/2018  Time:    10:50         ASSESSMENT     Encounter Diagnoses   Name Primary?    Posterior tibial tendonitis of right leg Yes    Acquired pes planovalgus of right foot     Arch pain of left foot     Foot arch pain, right          PLAN:   1. The patient was examined and evaluated   2. Treatment options were discussed in detail; the patient elected to undergo conservative treatment. I Reviewed films with patient, will order new xray  . Posterior tibial tendonitis of right leg  -     Ambulatory referral/consult to Physical/Occupational Therapy; Future; Expected date: 11/12/2020    Acquired pes planovalgus of right foot    Arch pain of left foot    Foot arch pain, right      Physical therapy outpatient referral  Night splint   Discussed surgical treatment if conservative therapy fails    Report Electronically Signed By:    Emely Jimenez DPM   Podiatric Medicine & Surgery  [facility]  11/12/2020    Disclaimer: This note was partially prepared using a voice recognition system and is likely to have sound alike errors within the text.

## 2020-12-17 ENCOUNTER — TELEPHONE (OUTPATIENT)
Dept: PODIATRY | Facility: CLINIC | Age: 55
End: 2020-12-17

## 2020-12-17 NOTE — TELEPHONE ENCOUNTER
----- Message from Sarah Julio sent at 12/17/2020  7:47 AM CST -----  Regarding: foot pain  Contact: Patient  Patient would like medication for her foot pain, she can not see an orthopedic provider until the end of the month and Tylenol is not helping. Please call to advise at Ph .[phone] (home)     .  [id number] [place] - [address] AT [facility]  [address] [address]  Phone: [phone] Fax: [phone]

## 2021-04-28 ENCOUNTER — PATIENT MESSAGE (OUTPATIENT)
Dept: RESEARCH | Facility: HOSPITAL | Age: 56
End: 2021-04-28